# Patient Record
(demographics unavailable — no encounter records)

---

## 2025-04-29 NOTE — HISTORY OF PRESENT ILLNESS
[FreeTextEntry1] : This is a 70 y/o male with a pmhx of CAD s/p PCI x 3 (most recent 2021), HTN, HLD, BPH who presents today for annual exam. CT abd/pelvis from 01/2024 with Bilateral sub centimeter Non obstructing renal calculi. No hydronephrosis in either kidney. 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 Seen by Dr. Hess, recommended MR abd. He reports of abdominal pain and itching since last year. He was started on mounjaro about 3 months ago.  Denies chest pain, palpitations, diaphoresis, vision changes, HA, dizziness, syncope, cough, wheezing, SOB/PANCHAL, edema, fever, chills, infection.

## 2025-05-13 NOTE — HISTORY OF PRESENT ILLNESS
[FreeTextEntry1] : f/up for Type 2 diabetes mellitus  Patient returns after long hiatus in f/up.   Screening  Ophthalmology:  due for visit  LDL: 13 microalbumin/Cr: on losartan EGFR: 96   Current diabetic medication regimen (verified with patient):   Mounjaro 2.5mg Q weekly ( no significant GI effects)  CT prior: normal pancreas and gallbladder.    Obesity   Follows a special diet: None Tried any diet plans/ meal replacements for weight control: No Tried OTC or prescription medication for weight control: mounjaro  Activity level: typically light activity  Proximal muscle weakness: No Prior weight loss surgery: No   History of the following:   Thyroid disease: No Heart disease: No Mood disorder: No Hypertension: Yes  Seizures: No

## 2025-05-16 NOTE — HISTORY OF PRESENT ILLNESS
[Current] : Current [TextBox_13] : Patient is scheduled for a annual  LDCT for lung cancer screening.  Chart review performed to confirm eligibility for LDCT.    No documented personal or family history of lung cancer. No documented s/s of lung cancer. Pt is a former smoker (2022) with a 22 pack year hx. [PacksperYear] : 22